# Patient Record
Sex: MALE | Race: WHITE | NOT HISPANIC OR LATINO | Employment: OTHER | URBAN - METROPOLITAN AREA
[De-identification: names, ages, dates, MRNs, and addresses within clinical notes are randomized per-mention and may not be internally consistent; named-entity substitution may affect disease eponyms.]

---

## 2018-10-11 ENCOUNTER — ESTABLISHED COMPREHENSIVE EXAM (OUTPATIENT)
Dept: URBAN - METROPOLITAN AREA CLINIC 43 | Facility: CLINIC | Age: 83
End: 2018-10-11

## 2018-10-11 DIAGNOSIS — Z96.1: ICD-10-CM

## 2018-10-11 DIAGNOSIS — H26.493: ICD-10-CM

## 2018-10-11 DIAGNOSIS — H35.363: ICD-10-CM

## 2018-10-11 DIAGNOSIS — E11.9: ICD-10-CM

## 2018-10-11 PROCEDURE — G8428 CUR MEDS NOT DOCUMENT: HCPCS

## 2018-10-11 PROCEDURE — G8785 BP SCRN NO PERF AT INTERVAL: HCPCS

## 2018-10-11 PROCEDURE — G9903 PT SCRN TBCO ID AS NON USER: HCPCS

## 2018-10-11 PROCEDURE — 92015 DETERMINE REFRACTIVE STATE: CPT

## 2018-10-11 PROCEDURE — 92014 COMPRE OPH EXAM EST PT 1/>: CPT

## 2018-10-11 PROCEDURE — 1036F TOBACCO NON-USER: CPT

## 2018-10-11 ASSESSMENT — TONOMETRY
OD_IOP_MMHG: 16
OS_IOP_MMHG: 14

## 2018-10-11 ASSESSMENT — VISUAL ACUITY
OS_BAT: 20/100
OS_SC: J10
OS_CC: J3
OS_SC: 20/40+2
OD_SC: J6
OD_CC: J2
OD_BAT: 20/100
OD_SC: 20/40

## 2018-12-04 ENCOUNTER — ESTABLISHED PATIENT (OUTPATIENT)
Dept: URBAN - METROPOLITAN AREA CLINIC 43 | Facility: CLINIC | Age: 83
End: 2018-12-04

## 2018-12-04 DIAGNOSIS — H00.14: ICD-10-CM

## 2018-12-04 PROCEDURE — G8482 FLU IMMUNIZE ORDER/ADMIN: HCPCS

## 2018-12-04 PROCEDURE — 11900 INJECT SKIN LESIONS </W 7: CPT

## 2018-12-04 PROCEDURE — 4040F PNEUMOC VAC/ADMIN/RCVD: CPT

## 2018-12-04 PROCEDURE — G8428 CUR MEDS NOT DOCUMENT: HCPCS

## 2018-12-04 PROCEDURE — 99213 OFFICE O/P EST LOW 20 MIN: CPT

## 2018-12-04 PROCEDURE — 1036F TOBACCO NON-USER: CPT

## 2018-12-04 PROCEDURE — G9903 PT SCRN TBCO ID AS NON USER: HCPCS

## 2018-12-04 PROCEDURE — 92285 EXTERNAL OCULAR PHOTOGRAPHY: CPT

## 2018-12-04 ASSESSMENT — VISUAL ACUITY
OS_SC: 20/50
OD_SC: 20/40-1

## 2019-01-17 ENCOUNTER — PREPPED CHART (OUTPATIENT)
Dept: URBAN - METROPOLITAN AREA CLINIC 43 | Facility: CLINIC | Age: 84
End: 2019-01-17

## 2019-10-17 ENCOUNTER — ESTABLISHED COMPREHENSIVE EXAM (OUTPATIENT)
Dept: URBAN - METROPOLITAN AREA CLINIC 43 | Facility: CLINIC | Age: 84
End: 2019-10-17

## 2019-10-17 DIAGNOSIS — H35.363: ICD-10-CM

## 2019-10-17 DIAGNOSIS — H02.052: ICD-10-CM

## 2019-10-17 DIAGNOSIS — Z96.1: ICD-10-CM

## 2019-10-17 DIAGNOSIS — H02.055: ICD-10-CM

## 2019-10-17 DIAGNOSIS — E11.9: ICD-10-CM

## 2019-10-17 PROCEDURE — 67820 REVISE EYELASHES: CPT

## 2019-10-17 PROCEDURE — 92015 DETERMINE REFRACTIVE STATE: CPT

## 2019-10-17 PROCEDURE — 92014 COMPRE OPH EXAM EST PT 1/>: CPT

## 2019-10-17 ASSESSMENT — TONOMETRY
OS_IOP_MMHG: 15
OD_IOP_MMHG: 14

## 2019-10-17 ASSESSMENT — VISUAL ACUITY
OS_SC: J12
OD_SC: J12
OD_SC: 20/30-1
OS_CC: J1
OD_CC: J1
OS_SC: 20/25-2

## 2020-07-25 ENCOUNTER — TELEPHONE ENCOUNTER (OUTPATIENT)
Dept: URBAN - METROPOLITAN AREA CLINIC 13 | Facility: CLINIC | Age: 85
End: 2020-07-25

## 2020-07-25 RX ORDER — ATENOLOL 50 MG
TABLET ORAL
Refills: 0 | OUTPATIENT
Start: 2006-12-13 | End: 2007-09-19

## 2020-07-25 RX ORDER — CORTISONE ACETATE 25 MG/1
TAKE AS DIRECTED TABLET ORAL
Refills: 0 | OUTPATIENT
Start: 2006-12-13 | End: 2007-02-08

## 2020-07-26 ENCOUNTER — TELEPHONE ENCOUNTER (OUTPATIENT)
Dept: URBAN - METROPOLITAN AREA CLINIC 13 | Facility: CLINIC | Age: 85
End: 2020-07-26

## 2020-07-26 RX ORDER — WARFARIN SODIUM 6 MG/1
TAKE 1 TABLET DAILY TABLET ORAL
Refills: 0 | Status: ACTIVE | COMMUNITY
Start: 2007-09-19

## 2020-07-26 RX ORDER — PREDNISONE 50 MG/1
TAKE 1 TABLET DAILY AS DIRECTED TABLET ORAL
Refills: 0 | Status: ACTIVE | COMMUNITY
Start: 2007-09-19

## 2020-08-13 NOTE — PATIENT DISCUSSION
Advised regular use of Amsler grid.
Amsler grid at home. MVI/AREDS discussed. Patient to call if any changes in vision or grid card.
Discussed AREDS 2 supplements, UV protection and green leafy vegetables.
Discussed AREDS supplements, BP Control, UV protection and dark leafy green vegetables.
Discussed the importance of blood sugar control in the prevention of ocular complications.
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
Glasses Rx given.
Instructed to call immediately if any new distortion, blurring, decreased vision or eye pain.
Monitor yearly for diabetic eye disease.
Order Fundus Image and Mac OCT.
Patient given Rx for glasses.
Patient understands condition, prognosis and need for follow up care.
Recommended yearly dilated eye examinations.
Referral to Retinal specialist for FA.
43y/o POD#1 s/p LOYD VILLARREAL for fibroids  POD# 1  Stable
43y/o POD#1 s/p LOYD VILLARREAL for fibroids  POD# 1  Stable

## 2020-08-20 NOTE — PATIENT DISCUSSION
Discussed in detail re: nature of condition, dry vs wet AMD, AREDS.  Maybe Vitelliform, High Risk PED, no fluid.

## 2020-10-16 ENCOUNTER — ESTABLISHED COMPREHENSIVE EXAM (OUTPATIENT)
Dept: URBAN - METROPOLITAN AREA CLINIC 43 | Facility: CLINIC | Age: 85
End: 2020-10-16

## 2020-10-16 DIAGNOSIS — E11.9: ICD-10-CM

## 2020-10-16 DIAGNOSIS — H26.493: ICD-10-CM

## 2020-10-16 DIAGNOSIS — H02.052: ICD-10-CM

## 2020-10-16 DIAGNOSIS — H02.055: ICD-10-CM

## 2020-10-16 DIAGNOSIS — H35.363: ICD-10-CM

## 2020-10-16 DIAGNOSIS — Z96.1: ICD-10-CM

## 2020-10-16 PROCEDURE — 92014 COMPRE OPH EXAM EST PT 1/>: CPT

## 2020-10-16 PROCEDURE — 92015 DETERMINE REFRACTIVE STATE: CPT

## 2020-10-16 ASSESSMENT — TONOMETRY
OS_IOP_MMHG: 17
OD_IOP_MMHG: 18

## 2020-10-16 ASSESSMENT — VISUAL ACUITY
OS_SC: 20/30-1
OS_SC: J10
OD_SC: 20/30-1
OD_SC: J10-

## 2021-10-15 ENCOUNTER — EST. PATIENT EMERGENCY (OUTPATIENT)
Dept: URBAN - METROPOLITAN AREA CLINIC 43 | Facility: CLINIC | Age: 86
End: 2021-10-15

## 2021-10-15 DIAGNOSIS — E11.9: ICD-10-CM

## 2021-10-15 DIAGNOSIS — H35.363: ICD-10-CM

## 2021-10-15 DIAGNOSIS — H53.122: ICD-10-CM

## 2021-10-15 DIAGNOSIS — H02.052: ICD-10-CM

## 2021-10-15 PROCEDURE — 92014 COMPRE OPH EXAM EST PT 1/>: CPT

## 2021-10-15 ASSESSMENT — TONOMETRY
OS_IOP_MMHG: 17
OD_IOP_MMHG: 161

## 2021-10-15 ASSESSMENT — VISUAL ACUITY
OS_SC: 20/25-1
OD_SC: 20/25-1

## 2022-05-03 ENCOUNTER — EMERGENCY VISIT (OUTPATIENT)
Dept: URBAN - METROPOLITAN AREA CLINIC 43 | Facility: CLINIC | Age: 87
End: 2022-05-03

## 2022-05-03 DIAGNOSIS — H02.055: ICD-10-CM

## 2022-05-03 DIAGNOSIS — E11.9: ICD-10-CM

## 2022-05-03 DIAGNOSIS — H00.11: ICD-10-CM

## 2022-05-03 DIAGNOSIS — H53.122: ICD-10-CM

## 2022-05-03 DIAGNOSIS — H02.052: ICD-10-CM

## 2022-05-03 DIAGNOSIS — H35.363: ICD-10-CM

## 2022-05-03 PROCEDURE — 92012 INTRM OPH EXAM EST PATIENT: CPT

## 2022-05-03 RX ORDER — NEOMYCIN SULFATE, POLYMYXIN B SULFATE AND DEXAMETHASONE 3.5; 10000; 1 MG/ML; [USP'U]/ML; MG/ML: 1 SUSPENSION OPHTHALMIC

## 2022-05-03 ASSESSMENT — VISUAL ACUITY
OD_SC: 20/40-2
OS_SC: 20/40-1

## 2022-10-24 ENCOUNTER — COMPREHENSIVE EXAM (OUTPATIENT)
Dept: URBAN - METROPOLITAN AREA CLINIC 43 | Facility: CLINIC | Age: 87
End: 2022-10-24

## 2022-10-24 DIAGNOSIS — H02.055: ICD-10-CM

## 2022-10-24 DIAGNOSIS — E11.9: ICD-10-CM

## 2022-10-24 DIAGNOSIS — H35.363: ICD-10-CM

## 2022-10-24 DIAGNOSIS — H26.493: ICD-10-CM

## 2022-10-24 DIAGNOSIS — H02.052: ICD-10-CM

## 2022-10-24 DIAGNOSIS — Z96.1: ICD-10-CM

## 2022-10-24 PROCEDURE — 92015 DETERMINE REFRACTIVE STATE: CPT

## 2022-10-24 PROCEDURE — 92014 COMPRE OPH EXAM EST PT 1/>: CPT

## 2022-10-24 ASSESSMENT — VISUAL ACUITY
OD_SC: 20/30-2
OS_SC: 20/40
OS_SC: J8
OS_BAT: 20/100+1
OD_SC: J12-

## 2022-10-24 ASSESSMENT — TONOMETRY
OD_IOP_MMHG: 15
OS_IOP_MMHG: 15

## 2023-09-21 ENCOUNTER — COMPREHENSIVE EXAM (OUTPATIENT)
Dept: URBAN - METROPOLITAN AREA CLINIC 43 | Facility: CLINIC | Age: 88
End: 2023-09-21

## 2023-09-21 DIAGNOSIS — H02.052: ICD-10-CM

## 2023-09-21 DIAGNOSIS — E11.9: ICD-10-CM

## 2023-09-21 DIAGNOSIS — H35.363: ICD-10-CM

## 2023-09-21 DIAGNOSIS — H26.493: ICD-10-CM

## 2023-09-21 DIAGNOSIS — Z96.1: ICD-10-CM

## 2023-09-21 DIAGNOSIS — H02.055: ICD-10-CM

## 2023-09-21 PROCEDURE — 92015 DETERMINE REFRACTIVE STATE: CPT

## 2023-09-21 PROCEDURE — 67820 REVISE EYELASHES: CPT

## 2023-09-21 PROCEDURE — 92014 COMPRE OPH EXAM EST PT 1/>: CPT

## 2023-09-21 ASSESSMENT — VISUAL ACUITY
OS_SC: J10
OD_SC: J10
OD_SC: 20/30
OS_SC: 20/25-1
OS_CC: J2
OD_CC: J2

## 2023-09-21 ASSESSMENT — TONOMETRY
OS_IOP_MMHG: 14
OD_IOP_MMHG: 14